# Patient Record
Sex: FEMALE | Race: BLACK OR AFRICAN AMERICAN | Employment: UNEMPLOYED | URBAN - METROPOLITAN AREA
[De-identification: names, ages, dates, MRNs, and addresses within clinical notes are randomized per-mention and may not be internally consistent; named-entity substitution may affect disease eponyms.]

---

## 2017-08-17 PROBLEM — N32.81 OAB (OVERACTIVE BLADDER): Status: ACTIVE | Noted: 2017-08-17

## 2018-09-13 ENCOUNTER — ANESTHESIA EVENT (OUTPATIENT)
Dept: ENDOSCOPY | Age: 58
End: 2018-09-13
Payer: MEDICARE

## 2018-09-14 RX ORDER — ALBUTEROL SULFATE 90 UG/1
2 AEROSOL, METERED RESPIRATORY (INHALATION)
COMMUNITY
Start: 2016-02-18

## 2018-09-14 RX ORDER — HYDROXYZINE 25 MG/1
TABLET, FILM COATED ORAL
Refills: 2 | COMMUNITY
Start: 2018-08-09

## 2018-09-14 RX ORDER — INSULIN GLARGINE 100 [IU]/ML
60 INJECTION, SOLUTION SUBCUTANEOUS
COMMUNITY
Start: 2018-08-22

## 2018-09-14 RX ORDER — SENNOSIDES 8.6 MG/1
8.6 TABLET ORAL AS NEEDED
COMMUNITY

## 2018-09-14 RX ORDER — BUTALBITAL, ACETAMINOPHEN AND CAFFEINE 50; 325; 40 MG/1; MG/1; MG/1
TABLET ORAL
COMMUNITY
Start: 2017-11-27

## 2018-09-14 RX ORDER — AMITRIPTYLINE HYDROCHLORIDE 75 MG/1
75 TABLET, FILM COATED ORAL
COMMUNITY
End: 2018-09-14 | Stop reason: CLARIF

## 2018-09-14 RX ORDER — MAGNESIUM 200 MG
1000 TABLET ORAL DAILY
COMMUNITY
Start: 2013-05-07

## 2018-09-14 RX ORDER — INSULIN ASPART 100 [IU]/ML
25 INJECTION, SOLUTION INTRAVENOUS; SUBCUTANEOUS
COMMUNITY

## 2018-09-14 RX ORDER — PRAVASTATIN SODIUM 40 MG/1
TABLET ORAL
COMMUNITY
Start: 2018-06-25

## 2018-09-14 NOTE — PERIOP NOTES
PAT - SURGICAL PRE-ADMISSION INSTRUCTIONS 
 
NAME:  Gaurav Bates                                                          TODAY'S DATE:  9/14/2018 SURGERY DATE:  9/17/2018                                  SURGERY ARRIVAL TIME:   0800 1. Do NOT eat or drink anything, including candy or gum, after MIDNIGHT on 09/16/2018 , unless you have specific instructions from your Surgeon or Anesthesia Provider to do so. 2. No smoking on the day of surgery. 3. No alcohol 24 hours prior to the day of surgery. 4. No recreational drugs for one week prior to the day of surgery. 5. Leave all valuables, including money/purse, at home. 6. Remove all jewelry, nail polish, makeup (including mascara); no lotions, powders, deodorant, or perfume/cologne/after shave. 7. Glasses/Contact lenses and Dentures may be worn to the hospital.  They will be removed prior to surgery. 8. Call your doctor if symptoms of a cold or illness develop within 24 ours prior to surgery. 9. AN ADULT MUST DRIVE YOU HOME AFTER OUTPATIENT SURGERY. 10. If you are having an OUTPATIENT procedure, please make arrangements for a responsible adult to be with you for 24 hours after your surgery. 11. If you are admitted to the hospital, you will be assigned to a bed after surgery is complete. Normally a family member will not be able to see you until you are in your assigned bed. 15. Family is encouraged to accompany you to the hospital.  We ask visitors in the treatment area to be limited to ONE person at a time to ensure patient privacy. EXCEPTIONS WILL BE MADE AS NEEDED. 15. Children under 12 are discouraged from entering the treatment area and need to be supervised by an adult when in the waiting room. Special Instructions: 
 
Bring list of CURRENT medications . , Bring inhaler. , Take these medications the morning of surgery with a sip of water:  metoprolol, Bring any pertinent legal medical records. , Follow physician instructions about insulin. If NO instructions were given, take your usual dose of BASAL insulin the night BEFORE surgery. , Complete bowel prep per MD instructions. May also take ativan the morning of the procedure These surgical instructions were reviewed with Alpesh Hernandez during the PAT phone call. Directions: On the morning of surgery, please go to the [address] [place] [address]. Enter the building from the [facility] entrance, 1st floor (next to the Emergency Room entrance). Take the elevator to the 2nd floor. Sign in at the Registration Desk. If you have any questions and/or concerns, please do not hesitate to call: 
(Prior to the day of surgery)  [facility] unit:  [phone] (Day of surgery)  [facility] unit:  [phone]

## 2018-09-17 ENCOUNTER — ANESTHESIA (OUTPATIENT)
Dept: ENDOSCOPY | Age: 58
End: 2018-09-17
Payer: MEDICARE

## 2018-09-17 ENCOUNTER — HOSPITAL ENCOUNTER (OUTPATIENT)
Age: 58
Setting detail: OUTPATIENT SURGERY
Discharge: HOME | End: 2018-09-17
Attending: INTERNAL MEDICINE | Admitting: INTERNAL MEDICINE
Payer: MEDICARE

## 2018-09-17 VITALS
SYSTOLIC BLOOD PRESSURE: 115 MMHG | DIASTOLIC BLOOD PRESSURE: 58 MMHG | BODY MASS INDEX: 43.88 KG/M2 | TEMPERATURE: 98 F | OXYGEN SATURATION: 92 % | HEIGHT: 62 IN | RESPIRATION RATE: 14 BRPM | WEIGHT: 238.44 LBS | HEART RATE: 84 BPM

## 2018-09-17 PROBLEM — Z86.010 HISTORY OF COLON POLYPS: Status: ACTIVE | Noted: 2018-09-17

## 2018-09-17 LAB — GLUCOSE BLD STRIP.AUTO-MCNC: 182 MG/DL (ref 70–110)

## 2018-09-17 PROCEDURE — 77030031670 HC DEV INFL ENDOTEK BIG60 MRTM -B: Performed by: INTERNAL MEDICINE

## 2018-09-17 PROCEDURE — 74011250636 HC RX REV CODE- 250/636

## 2018-09-17 PROCEDURE — 74011636637 HC RX REV CODE- 636/637: Performed by: NURSE ANESTHETIST, CERTIFIED REGISTERED

## 2018-09-17 PROCEDURE — 88305 TISSUE EXAM BY PATHOLOGIST: CPT | Performed by: INTERNAL MEDICINE

## 2018-09-17 PROCEDURE — 76040000007: Performed by: INTERNAL MEDICINE

## 2018-09-17 PROCEDURE — 82962 GLUCOSE BLOOD TEST: CPT

## 2018-09-17 PROCEDURE — 74011250636 HC RX REV CODE- 250/636: Performed by: NURSE ANESTHETIST, CERTIFIED REGISTERED

## 2018-09-17 PROCEDURE — 76060000032 HC ANESTHESIA 0.5 TO 1 HR: Performed by: INTERNAL MEDICINE

## 2018-09-17 RX ORDER — LIDOCAINE HYDROCHLORIDE 10 MG/ML
0.1 INJECTION, SOLUTION EPIDURAL; INFILTRATION; INTRACAUDAL; PERINEURAL AS NEEDED
Status: DISCONTINUED | OUTPATIENT
Start: 2018-09-17 | End: 2018-09-17 | Stop reason: HOSPADM

## 2018-09-17 RX ORDER — SODIUM CHLORIDE 0.9 % (FLUSH) 0.9 %
5-10 SYRINGE (ML) INJECTION EVERY 8 HOURS
Status: CANCELLED | OUTPATIENT
Start: 2018-09-17 | End: 2018-09-17

## 2018-09-17 RX ORDER — DEXTROSE MONOHYDRATE 25 G/50ML
25-50 INJECTION, SOLUTION INTRAVENOUS AS NEEDED
Status: DISCONTINUED | OUTPATIENT
Start: 2018-09-17 | End: 2018-09-17 | Stop reason: HOSPADM

## 2018-09-17 RX ORDER — PROPOFOL 10 MG/ML
INJECTION, EMULSION INTRAVENOUS AS NEEDED
Status: DISCONTINUED | OUTPATIENT
Start: 2018-09-17 | End: 2018-09-17 | Stop reason: HOSPADM

## 2018-09-17 RX ORDER — INSULIN LISPRO 100 [IU]/ML
INJECTION, SOLUTION INTRAVENOUS; SUBCUTANEOUS ONCE
Status: COMPLETED | OUTPATIENT
Start: 2018-09-17 | End: 2018-09-17

## 2018-09-17 RX ORDER — SODIUM CHLORIDE 9 MG/ML
25 INJECTION, SOLUTION INTRAVENOUS CONTINUOUS
Status: CANCELLED | OUTPATIENT
Start: 2018-09-17 | End: 2018-09-17

## 2018-09-17 RX ORDER — MAGNESIUM SULFATE 100 %
4 CRYSTALS MISCELLANEOUS AS NEEDED
Status: DISCONTINUED | OUTPATIENT
Start: 2018-09-17 | End: 2018-09-17 | Stop reason: HOSPADM

## 2018-09-17 RX ORDER — FAMOTIDINE 20 MG/1
20 TABLET, FILM COATED ORAL ONCE
Status: DISCONTINUED | OUTPATIENT
Start: 2018-09-17 | End: 2018-09-17 | Stop reason: HOSPADM

## 2018-09-17 RX ORDER — SODIUM CHLORIDE 0.9 % (FLUSH) 0.9 %
5-10 SYRINGE (ML) INJECTION AS NEEDED
Status: CANCELLED | OUTPATIENT
Start: 2018-09-17 | End: 2018-09-17

## 2018-09-17 RX ORDER — SODIUM CHLORIDE, SODIUM LACTATE, POTASSIUM CHLORIDE, CALCIUM CHLORIDE 600; 310; 30; 20 MG/100ML; MG/100ML; MG/100ML; MG/100ML
75 INJECTION, SOLUTION INTRAVENOUS CONTINUOUS
Status: DISCONTINUED | OUTPATIENT
Start: 2018-09-17 | End: 2018-09-17 | Stop reason: HOSPADM

## 2018-09-17 RX ADMIN — PROPOFOL 100 MG: 10 INJECTION, EMULSION INTRAVENOUS at 10:07

## 2018-09-17 RX ADMIN — PROPOFOL 100 MG: 10 INJECTION, EMULSION INTRAVENOUS at 10:16

## 2018-09-17 RX ADMIN — SODIUM CHLORIDE, SODIUM LACTATE, POTASSIUM CHLORIDE, AND CALCIUM CHLORIDE 75 ML/HR: 600; 310; 30; 20 INJECTION, SOLUTION INTRAVENOUS at 09:44

## 2018-09-17 RX ADMIN — INSULIN LISPRO 3 UNITS: 100 INJECTION, SOLUTION INTRAVENOUS; SUBCUTANEOUS at 09:41

## 2018-09-17 RX ADMIN — PROPOFOL 100 MG: 10 INJECTION, EMULSION INTRAVENOUS at 10:03

## 2018-09-17 RX ADMIN — PROPOFOL 100 MG: 10 INJECTION, EMULSION INTRAVENOUS at 10:05

## 2018-09-17 NOTE — IP AVS SNAPSHOT
Cl Doe 
 
 
 [address] 
[phone] Patient: Meredith Woodall MRN: [id number] :1960 About your hospitalization You were admitted on:  2018 You last received care in the:  [facility] PHASE 2 RECOVERY You were discharged on:  2018 Why you were hospitalized Your primary diagnosis was:  History Of Colon Polyps Follow-up Information Follow up With Details Comments Contact Info Izzy Quinonez MD  As needed [place] [address] [facility] [id number] 
[phone] Del Flynn MD   [address] [facility] [address] [id number] 
[phone] Discharge Orders None A check shaheen indicates which time of day the medication should be taken. My Medications CONTINUE taking these medications Instructions Each Dose to Equal  
 Morning Noon Evening Bedtime  
 albuterol 90 mcg/actuation inhaler Commonly known as:  PROVENTIL HFA, VENTOLIN HFA, PROAIR HFA Your last dose was: Your next dose is:    
   
   
 2 Puffs. 2 Puff  
    
   
   
   
  
 amitriptyline 75 mg tablet Commonly known as:  ELAVIL Your last dose was: Your next dose is: Take  by mouth nightly. benztropine 0.5 mg tablet Commonly known as:  COGENTIN Your last dose was: Your next dose is: Take 0.5 mg by mouth two (2) times a day. 0.5 mg  
    
   
   
   
  
 butalbital-acetaminophen-caffeine -40 mg per tablet Commonly known as:  Lois Galvez Your last dose was: Your next dose is: Take 1 Tab by Mouth Every 4 Hours As Needed for Pain (headache). Max 4 tabs per day. cyanocobalamin 1,000 mcg sublingual tablet Commonly known as:  VITAMIN B-12 Your last dose was: Your next dose is:    
   
   
 1,000 mcg. 1000 mcg dicyclomine 20 mg tablet Commonly known as:  BENTYL Your last dose was: Your next dose is: Take 20 mg by mouth every six (6) hours. 20 mg  
    
   
   
   
  
 donepezil 10 mg tablet Commonly known as:  ARICEPT Your last dose was: Your next dose is: Take 10 mg by mouth nightly. 10 mg  
    
   
   
   
  
 EPIPEN 0.3 mg/0.3 mL injection Generic drug:  EPINEPHrine Your last dose was: Your next dose is: 0.3 mg by IntraMUSCular route once as needed. 0.3 mg  
    
   
   
   
  
 fexofenadine 180 mg tablet Commonly known as:  Rosado Pali Your last dose was: Your next dose is: Take  by mouth. fluticasone 50 mcg/actuation nasal spray Commonly known as:  Jeison Esposito Your last dose was: Your next dose is: 2 Sprays by Both Nostrils route daily. 2 Spray  
    
   
   
   
  
 furosemide 40 mg tablet Commonly known as:  LASIX Your last dose was: Your next dose is: Take  by mouth daily. hydrOXYzine HCl 25 mg tablet Commonly known as:  ATARAX Your last dose was: Your next dose is: TAKE 1 TO 2 TABLETS BY MOUTH  EVERY EVENING  
     
   
   
   
  
 insulin aspart U-100 100 unit/mL Inpn Commonly known as:  Toro Dia Your last dose was: Your next dose is:    
   
   
 25 Units. 25 Units  
    
   
   
   
  
 insulin glargine 100 unit/mL (3 mL) Inpn Commonly known as:  Sandra Brower Your last dose was: Your next dose is:    
   
   
 60 Units. 60 Units  
    
   
   
   
  
 levocetirizine 5 mg tablet Commonly known as:  Sadiq Rodriguez Your last dose was: Your next dose is: Take  by mouth. LORazepam 0.5 mg tablet Commonly known as:  ATIVAN Your last dose was: Your next dose is: Take  by mouth. methocarbamol 750 mg tablet Commonly known as:  ROBAXIN Your last dose was: Your next dose is: Take  by mouth four (4) times daily. montelukast 10 mg tablet Commonly known as:  SINGULAIR Your last dose was: Your next dose is: Take 10 mg by mouth daily. 10 mg NITROSTAT 0.4 mg SL tablet Generic drug:  nitroglycerin Your last dose was: Your next dose is:    
   
   
 by SubLINGual route every five (5) minutes as needed for Chest Pain. OMALIZUMAB SC Your last dose was: Your next dose is:    
   
   
 Inject  beneath the skin Every 30 Days. Omeprazole delayed release 20 mg tablet Commonly known as:  PRILOSEC D/R Your last dose was: Your next dose is: Take 20 mg by mouth daily. 20 mg  
    
   
   
   
  
 perphenazine 2 mg tablet Commonly known as:  TRILAFON Your last dose was: Your next dose is: Take  by mouth.  
     
   
   
   
  
 pindolol 10 mg tablet Commonly known as:  VISKEN Your last dose was: Your next dose is: Take  by mouth daily. potassium chloride 20 mEq tablet Commonly known as:  K-DUR, KLOR-CON Your last dose was: Your next dose is: Take  by mouth two (2) times a day. pravastatin 40 mg tablet Commonly known as:  PRAVACHOL Your last dose was: Your next dose is: TAKE 1 TABLET BY MOUTH ONCE DAILY  
     
   
   
   
  
 senna 8.6 mg tablet Commonly known as:  Ed Hoffman Your last dose was: Your next dose is:    
   
   
 8.6 mg.  
 8.6 mg  
    
   
   
   
  
 spironolactone 50 mg tablet Commonly known as:  ALDACTONE Your last dose was: Your next dose is: Take  by mouth daily. topiramate 50 mg tablet Commonly known as:  TOPAMAX Your last dose was: Your next dose is: Take  by mouth two (2) times a day. VITAMIN D2 50,000 unit capsule Generic drug:  ergocalciferol Your last dose was: Your next dose is: Take 50,000 Units by mouth every seven (7) days. [address] Units Discharge Instructions COLONOSCOPY DISCHARGE INSTRUCTIONS You have just had an examination of your colon (large intestine). The medication given to you during your procedure will be acting in your body for the next 12 hours, gradually wearing off. Your face may be flushed, skin may be warm and sweaty, you might feel a little bloated or nauseated and sleepy. Please be aware of the followin. For the next 12 hours you SHOULD NOT: 
 
a. Drive, Operate any machinery. b. Drink alcohol. 
c. Engage in activities that require mental sharpness or manual dexterity such as cooking. d. Take any medications other than prescribed by a physician. 
e. Make any legal or financial decisions. 2. Call this office immediately, if: 
 
a. Onset of new or increase of tanisha rectal bleeding. 
b. Fever > 101 
c. Increased abdominal pain Additional instructions: 
 
a. Diet as recommended 
b. Due to risk of dehydration after colon prep and sedation, avoid extended periods of time outdoors if the day is hot and humid. c. If biopsies were taken, you will receive a post card or telephone call with results of your biopsies and suggestion for your next colonoscopy. Please allow us at least 3 weeks to get back with you about your results. If we have not contacted you by the, please call us for results. # [phone] 
d.  If you had polyp(s) removed DO NOT TAKE NSAIDS (Aspirin, Advil, Aleve, Naproxyn, Ibuprofen, etc) for 2 weeks. Tylenol is OK to use. DISCHARGE SUMMARY from Nurse PATIENT INSTRUCTIONS: 
 
After general anesthesia or intravenous sedation, for 24 hours or while taking prescription Narcotics: · Limit your activities · Do not drive and operate hazardous machinery · Do not make important personal or business decisions · Do  not drink alcoholic beverages · If you have not urinated within 8 hours after discharge, please contact your surgeon on call. Report the following to your surgeon: 
· Excessive pain, swelling, redness or odor of or around the surgical area · Temperature over 100.5 · Nausea and vomiting lasting longer than 4 hours or if unable to take medications · Any signs of decreased circulation or nerve impairment to extremity: change in color, persistent  numbness, tingling, coldness or increase pain · Any questions What to do at Home: 
Recommended activity: Activity as tolerated and no driving for today, These are general instructions for a healthy lifestyle: No smoking/ No tobacco products/ Avoid exposure to second hand smoke Surgeon General's Warning:  Quitting smoking now greatly reduces serious risk to your health. Obesity, smoking, and sedentary lifestyle greatly increases your risk for illness A healthy diet, regular physical exercise & weight monitoring are important for maintaining a healthy lifestyle You may be retaining fluid if you have a history of heart failure or if you experience any of the following symptoms:  Weight gain of 3 pounds or more overnight or 5 pounds in a week, increased swelling in our hands or feet or shortness of breath while lying flat in bed. Please call your doctor as soon as you notice any of these symptoms; do not wait until your next office visit. Recognize signs and symptoms of STROKE: 
 
F-face looks uneven A-arms unable to move or move unevenly S-speech slurred or non-existent T-time-call 911 as soon as signs and symptoms begin-DO NOT go Back to bed or wait to see if you get better-TIME IS BRAIN. Warning Signs of HEART ATTACK Call 911 if you have these symptoms: 
? Chest discomfort. Most heart attacks involve discomfort in the center of the chest that lasts more than a few minutes, or that goes away and comes back. It can feel like uncomfortable pressure, squeezing, fullness, or pain. ? Discomfort in other areas of the upper body. Symptoms can include pain or discomfort in one or both arms, the back, neck, jaw, or stomach. ? Shortness of breath with or without chest discomfort. ? Other signs may include breaking out in a cold sweat, nausea, or lightheadedness. Don't wait more than five minutes to call [address]! Fast action can save your life. Calling 911 is almost always the fastest way to get lifesaving treatment. Emergency Medical Services staff can begin treatment when they arrive  up to an hour sooner than if someone gets to the hospital by car. The discharge information has been reviewed with the patient. The patient verbalized understanding. Discharge medications reviewed with the patient and appropriate educational materials and side effects teaching were provided. Patient armband removed and given to patient to take home. Patient was informed of the privacy risks if armband lost or stolen 
 
___________________________________________________________________________________________________________________________________ Introducing [facility]! Dear Giovani Starr: Thank you for requesting a [employer] account. Our records indicate that you already have an active [employer] D[employer] account. You can access your account anytime at [email or web address]. [employer] Did you know that you can access your hospital and ER discharge instructions at any time in [employer] D[employer]?   You can also review all of your test results from your hospital stay or ER visit. Additional Information If you have questions, please visit the Frequently Asked Questions section of the [employer] website at [email or web address] Remember, [employer] is NOT to be used for urgent needs. For medical emergencies, dial 911. Now available from your iPhone and Android! Introducing Hao Escobedo As a [facility] patient, I wanted to make you aware of our electronic visit tool called Hao Escobedo. [employer] 24/7 allows you to connect within minutes with a medical provider 24 hours a day, seven days a week via a mobile device or tablet or logging into a secure website from your computer. You can access Hao Escobedo from anywhere in the [place]. A virtual visit might be right for you when you have a simple condition and feel like you just dont want to get out of bed, or cant get away from work for an appointment, when your regular [facility] provider is not available (evenings, weekends or holidays), or when youre out of town and need minor care. Electronic visits cost only $49 and if the [employer] 24/7 provider determines a prescription is needed to treat your condition, one can be electronically transmitted to a nearby pharmacy*. Please take a moment to enroll today if you have not already done so. The enrollment process is free and takes just a few minutes. To enroll, please download the [employer] 24/7 laurie to your tablet or phone, or visit [email or web address] to enroll on your computer. And, as an [address] [place] [address] patient with a [employer] account, the results of your visits will be scanned into your electronic medical record and your primary care provider will be able to view the scanned results. We urge you to continue to see your regular [facility] provider for your ongoing medical care.   And while your primary care provider may not be the one available when you seek a Hao Escobedo virtual visit, the peace of mind you get from getting a real diagnosis real time can be priceless. For more information on Hao Escobedo, view our Frequently Asked Questions (FAQs) at [email or web address]. Sincerely, 
 
Ramirez Julian MD 
Chief Medical Officer Jd Echols *:  certain medications cannot be prescribed via Hao Escobedo Providers Seen During Your Hospitalization Provider Specialty Primary office phone Bibi Jon MD Gastroenterology [phone] Your Primary Care Physician (PCP) Primary Care Physician Office Phone Office Fax 11 [address] [phone] You are allergic to the following Allergen Reactions Seafood Hives Iodinated Contrast- Oral And Iv Dye Other (comments) Prednisone Other (comments) Elevates her BS. Recent Documentation Height Weight Breastfeeding? BMI OB Status Smoking Status 1.575 m 108.2 kg No 43.61 kg/m2 Perimenopausal Never Smoker Emergency Contacts Name Discharge Info Relation Home Work Mobile McNair Sr.,Duane DISCHARGE CAREGIVER [3] Spouse [3] [phone] Patient Belongings The following personal items are in your possession at time of discharge: 
  Dental Appliances: None  Visual Aid: Glasses Please provide this summary of care documentation to your next provider. Signatures-by signing, you are acknowledging that this After Visit Summary has been reviewed with you and you have received a copy. Patient Signature:  ____________________________________________________________ Date:  ____________________________________________________________  
  
Mathew Sport Provider Signature:  ____________________________________________________________ Date:  ____________________________________________________________

## 2018-09-17 NOTE — PERIOP NOTES
Phase 2 Recovery Summary Patient arrived to Phase 2 at 1256 Report received from Chantell Mohr, [address] [facility] Vitals:  
 09/14/18 1211 09/17/18 0905 09/17/18 1034 09/17/18 1035 BP:  119/80 115/58 115/58 Pulse:  70 84 84 Resp:  20 14 Temp:  98 °F (36.7 °C) SpO2:  96% 92% 92% Weight: 99.8 kg (220 lb) 108.2 kg (238 lb 7 oz) Height: 5' 2\" (1.575 m) 5' 2\" (1.575 m) disoriented Lines and Drains Peripheral Intravenous Line:  
Peripheral IV 09/17/18 Right Hand (Active) Site Assessment Clean, dry, & intact 9/17/2018  9:08 AM  
Phlebitis Assessment 0 9/17/2018  9:08 AM  
Infiltration Assessment 0 9/17/2018  9:08 AM  
Dressing Status Clean, dry, & intact 9/17/2018  9:08 AM  
Dressing Type Tape;Transparent 9/17/2018  9:08 AM  
Hub Color/Line Status Pink; Infusing 9/17/2018  9:08 AM  
 
 
Wound 46- Dr. Juwan Gutierrez at bedside discussing results with family. Patient difficult to arouse in phase 2. IV fluids continue to infuse and after approx 15 minutes, patient was able to become alert enough to tolerate liquids and sit patient up in chair. No complaints of pain or discomfort. Questions and concerns addressed. Patient taken to private vehicle via wheelchair. Patient discharged to home with   at  [facility]

## 2018-09-17 NOTE — ANESTHESIA POSTPROCEDURE EVALUATION
Post-Anesthesia Evaluation and Assessment Patient: Alexus Rey MRN: [id number]  SSN: [id number] YOB: 1960  Age: 63 y.o. Sex: female Cardiovascular Function/Vital Signs Visit Vitals  /58  Pulse 84  Temp 36.7 °C (98 °F)  Resp 14  
 Ht 5' 2\" (1.575 m)  Wt 108.2 kg (238 lb 7 oz)  SpO2 92%  Breastfeeding No  
 BMI 43.61 kg/m2 Patient is status post MAC anesthesia for Procedure(s): 
COLONOSCOPY. Nausea/Vomiting: None Postoperative hydration reviewed and adequate. Pain: 
Pain Scale 1: Numeric (0 - 10) (09/17/18 1055) Pain Intensity 1: 0 (09/17/18 1055) Managed Neurological Status: At baseline Mental Status and Level of Consciousness: Alert and oriented Pulmonary Status:  
O2 Device: Room air (09/17/18 1034) Adequate oxygenation and airway patent Complications related to anesthesia: None Post-anesthesia assessment completed. No concerns Signed By: Ancel Ganser, MD   
 September 17, 2018

## 2018-09-17 NOTE — DISCHARGE INSTRUCTIONS
COLONOSCOPY DISCHARGE INSTRUCTIONS    You have just had an examination of your colon (large intestine). The medication given to you during your procedure will be acting in your body for the next 12 hours, gradually wearing off. Your face may be flushed, skin may be warm and sweaty, you might feel a little bloated or nauseated and sleepy. Please be aware of the followin. For the next 12 hours you SHOULD NOT:    a. Drive, Operate any machinery. b. Drink alcohol.  c. Engage in activities that require mental sharpness or manual dexterity such as cooking. d. Take any medications other than prescribed by a physician.  e. Make any legal or financial decisions. 2. Call this office immediately, if:    a. Onset of new or increase of tanisha rectal bleeding.  b. Fever > 101  c. Increased abdominal pain    Additional instructions:    a. Diet as recommended  b. Due to risk of dehydration after colon prep and sedation, avoid extended periods of time outdoors if the day is hot and humid. c. If biopsies were taken, you will receive a post card or telephone call with results of your biopsies and suggestion for your next colonoscopy. Please allow us at least 3 weeks to get back with you about your results. If we have not contacted you by the, please call us for results. Ph# [phone]  d. If you had polyp(s) removed DO NOT TAKE NSAIDS (Aspirin, Advil, Aleve, Naproxyn, Ibuprofen, etc) for 2 weeks. Tylenol is OK to use. DISCHARGE SUMMARY from Nurse    PATIENT INSTRUCTIONS:    After general anesthesia or intravenous sedation, for 24 hours or while taking prescription Narcotics:  · Limit your activities  · Do not drive and operate hazardous machinery  · Do not make important personal or business decisions  · Do  not drink alcoholic beverages  · If you have not urinated within 8 hours after discharge, please contact your surgeon on call.     Report the following to your surgeon:  · Excessive pain, swelling, redness or odor of or around the surgical area  · Temperature over 100.5  · Nausea and vomiting lasting longer than 4 hours or if unable to take medications  · Any signs of decreased circulation or nerve impairment to extremity: change in color, persistent  numbness, tingling, coldness or increase pain  · Any questions    What to do at Home:  Recommended activity: Activity as tolerated and no driving for today,       These are general instructions for a healthy lifestyle:    No smoking/ No tobacco products/ Avoid exposure to second hand smoke  Surgeon General's Warning:  Quitting smoking now greatly reduces serious risk to your health. Obesity, smoking, and sedentary lifestyle greatly increases your risk for illness    A healthy diet, regular physical exercise & weight monitoring are important for maintaining a healthy lifestyle    You may be retaining fluid if you have a history of heart failure or if you experience any of the following symptoms:  Weight gain of 3 pounds or more overnight or 5 pounds in a week, increased swelling in our hands or feet or shortness of breath while lying flat in bed. Please call your doctor as soon as you notice any of these symptoms; do not wait until your next office visit. Recognize signs and symptoms of STROKE:    F-face looks uneven    A-arms unable to move or move unevenly    S-speech slurred or non-existent    T-time-call 911 as soon as signs and symptoms begin-DO NOT go       Back to bed or wait to see if you get better-TIME IS BRAIN. Warning Signs of HEART ATTACK     Call 911 if you have these symptoms:   Chest discomfort. Most heart attacks involve discomfort in the center of the chest that lasts more than a few minutes, or that goes away and comes back. It can feel like uncomfortable pressure, squeezing, fullness, or pain.  Discomfort in other areas of the upper body.  Symptoms can include pain or discomfort in one or both arms, the back, neck, jaw, or stomach.  Shortness of breath with or without chest discomfort.  Other signs may include breaking out in a cold sweat, nausea, or lightheadedness. Don't wait more than five minutes to call 911 - MINUTES MATTER! Fast action can save your life. Calling 911 is almost always the fastest way to get lifesaving treatment. Emergency Medical Services staff can begin treatment when they arrive -- up to an hour sooner than if someone gets to the hospital by car. The discharge information has been reviewed with the patient. The patient verbalized understanding. Discharge medications reviewed with the patient and appropriate educational materials and side effects teaching were provided. Patient armband removed and given to patient to take home.   Patient was informed of the privacy risks if armband lost or stolen    ___________________________________________________________________________________________________________________________________

## 2018-09-17 NOTE — ANESTHESIA PREPROCEDURE EVALUATION
Anesthetic History No history of anesthetic complications Review of Systems / Medical History Patient summary reviewed and pertinent labs reviewed Pulmonary Sleep apnea: CPAP Asthma Neuro/Psych Psychiatric history Cardiovascular Hypertension Exercise tolerance: >4 METS 
  
GI/Hepatic/Renal 
Within defined limits Endo/Other Morbid obesity Other Findings Comments: Documentation of current medication Current medications obtained, documented and obtained? YES Risk Factors for Postoperative nausea/vomiting: 
     History of postoperative nausea/vomiting? NO Female? YES Motion sickness? NO Intended opioid administration for postoperative analgesia? NO Smoking Abstinence: 
Current Smoker? NO Elective Surgery? YES Seen preoperatively by anesthesiologist or proxy prior to day of surgery? YES Pt abstained from smoking 24 hours prior to anesthesia? N/A Preventive care/screening for High Blood Pressure: 
Aged 18 years and older: YES Screened for high blood pressure: YES Patients with high blood pressure referred to primary care provider 
 for BP management: YES Physical Exam 
 
Airway Mallampati: III 
TM Distance: 4 - 6 cm Neck ROM: normal range of motion Mouth opening: Normal 
 
 Cardiovascular Regular rate and rhythm,  S1 and S2 normal,  no murmur, click, rub, or gallop Rhythm: regular Rate: normal 
 
 
 
 Dental 
 
Dentition: Lower dentition intact and Upper dentition intact Pulmonary Breath sounds clear to auscultation Abdominal 
GI exam deferred Other Findings Anesthetic Plan ASA: 3 Anesthesia type: MAC Induction: Intravenous Anesthetic plan and risks discussed with: Patient

## 2018-09-17 NOTE — H&P
Date of Surgery Update: 
Augusta Issa was seen and examined. History and physical has been reviewed. The patient has been examined.  There have been no significant clinical changes since the completion of the originally dated History and Physical. 
 
Signed By: Sita Morales MD   
 September 17, 2018 9:13 AM

## 2018-09-17 NOTE — PROCEDURES
Colonoscopy Procedure Note    Patient: Camille Mojica MRN: [id number]  SSN: [id number]    YOB: 1960  Age: 63 y.o. Sex: female      Date of Procedure: 9/17/2018      Procedures:  COLONOSCOPY:   HISTORY UPDATE: History and physical has been reviewed. The patient has been examined. There have been no significant clinical changes since the completion of the originally dated History and Physical.   INDICATION:    Family history of coloretal cancer (screening only), Screening colonoscopy   PROCEDURE PERFORMED:Colonoscopy was complete to cecum. ENDOSCOPIST: Maria Esther Tavarez MD   ASSISTANT:  Endoscopy Technician-1: Faith Mujica  Endoscopy RN-1: Alexandra Akhtar RN   CLASSIFICATION OF PREOPERATIVE RISK: ASA 2 - Patient with mild systemic disease with no functional limitations   ANESTHESIA:  MAC anesthesia   ENDOSCOPE: [id number]                                                                                                        EXTENT OF EXAM: Cecum    QUALITY OF COLON PREPARATION:  good     DESCRIPTION OF PROCEDURE:  The procedure was discussed with the     patient including but not limited to IV conscious sedation, bleeding, perforation and missed polyps and the consent form was signed and witnessed. A safety timeout was performed. Procedure done with MAC. The patient's vitals were monitored at all times, including heart rate and rhythm, oxygen saturation, and blood pressure. The patient was then placed into the left lateral decubitus position. A rectal exam was performed. The Olympus Adult diagnostic colonscope was then passed under direct visualization with ease to the cecum. The cecum was identified by landmarks including Ileocecal valve, appendiceal orifice and crow's foot. The scope was then slowly withdrawn while closely visualizing the mucosa in the rectum a retroflection was performed and distal rectum visualized. The scope was then removed.   The patient was transferred to the recovery room in stable condition. FINDINGS:1. A few diminutive polyps in the rectum removed with cold biopsy forceps 2. Mild diverticulosis seen on the left side of the colon 3. Mild melanosis coli seen throughout the colon 4. No hemorrhoids seen on retroflexion. EBL: None   SPECIMENS:   ID Type Source Tests Collected by Time Destination   1 : bx polyp Preservative Rectum  Erica Duque MD 9/17/2018 1021 Pathology      IMPRESSION: 1. A few diminutive polyps in the rectum removed with cold biopsy forceps 2. Mild diverticulosis seen on the left side of the colon 3. Mild melanosis coli seen throughout the colon 4. No hemorrhoids seen on retroflexion. RECOMMENDATIONS:               1.   Follow up on the biopsy results                  2.  Repeat colonoscopy in 5 yrs.                    Follow Up:   As needed       Leane Olszewski, MD   9/17/2018

## 2019-05-16 RX ORDER — ASPIRIN 81 MG/1
81 TABLET ORAL DAILY
COMMUNITY

## 2019-05-16 NOTE — PERIOP NOTES
PAT - SURGICAL PRE-ADMISSION INSTRUCTIONS 
 
NAME:  Andres Smith                                                          TODAY'S DATE:  5/16/2019 SURGERY DATE:  5/20/2019                                  SURGERY ARRIVAL TIME:   7937 1. Do NOT eat or drink anything, including candy or gum, after MIDNIGHT on 05/19 , unless you have specific instructions from your Surgeon or Anesthesia Provider to do so. 2. No smoking on the day of surgery. 3. No alcohol 24 hours prior to the day of surgery. 4. No recreational drugs for one week prior to the day of surgery. 5. Leave all valuables, including money/purse, at home. 6. Remove all jewelry, nail polish, makeup (including mascara); no lotions, powders, deodorant, or perfume/cologne/after shave. 7. Glasses/Contact lenses and Dentures may be worn to the hospital.  They will be removed prior to surgery. 8. Call your doctor if symptoms of a cold or illness develop within 24 ours prior to surgery. 9. AN ADULT MUST DRIVE YOU HOME AFTER OUTPATIENT SURGERY. 10. If you are having an OUTPATIENT procedure, please make arrangements for a responsible adult to be with you for 24 hours after your surgery. 11. If you are admitted to the hospital, you will be assigned to a bed after surgery is complete. Normally a family member will not be able to see you until you are in your assigned bed. 15. Family is encouraged to accompany you to the hospital.  We ask visitors in the treatment area to be limited to ONE person at a time to ensure patient privacy. EXCEPTIONS WILL BE MADE AS NEEDED. 15. Children under 12 are discouraged from entering the treatment area and need to be supervised by an adult when in the waiting room. Special Instructions: Take these medications the morning of surgery with a sip of water:  NONE, Follow physician instructions about insulin. If NO instructions were given, take your usual dose of BASAL insulin the night BEFORE surgery. Patient Prep: 
 
shower with anti-bacterial soap These surgical instructions were reviewed with Rupert De La Torre during the PAT phone call. Directions: On the morning of surgery, please go to the [address] [facility]. Enter the building from the [facility] entrance, 1st floor (next to the Emergency Room entrance). Take the elevator to the 2nd floor. Sign in at the Registration Desk. If you have any questions and/or concerns, please do not hesitate to call: 
(Prior to the day of surgery)  [facility] unit:  [phone] (Day of surgery)  [facility] unit:  [phone]

## 2019-05-17 ENCOUNTER — ANESTHESIA EVENT (OUTPATIENT)
Dept: SURGERY | Age: 59
End: 2019-05-17
Payer: MEDICARE

## 2019-05-20 ENCOUNTER — ANESTHESIA (OUTPATIENT)
Dept: SURGERY | Age: 59
End: 2019-05-20
Payer: MEDICARE

## 2019-05-20 ENCOUNTER — HOSPITAL ENCOUNTER (OUTPATIENT)
Age: 59
Setting detail: OUTPATIENT SURGERY
Discharge: HOME | End: 2019-05-20
Attending: DENTIST | Admitting: DENTIST
Payer: MEDICARE

## 2019-05-20 VITALS
TEMPERATURE: 97.6 F | DIASTOLIC BLOOD PRESSURE: 63 MMHG | SYSTOLIC BLOOD PRESSURE: 107 MMHG | OXYGEN SATURATION: 100 % | RESPIRATION RATE: 16 BRPM | BODY MASS INDEX: 42.91 KG/M2 | HEIGHT: 62 IN | WEIGHT: 233.2 LBS | HEART RATE: 70 BPM

## 2019-05-20 DIAGNOSIS — K02.9 CARIES: Primary | ICD-10-CM

## 2019-05-20 LAB
GLUCOSE BLD STRIP.AUTO-MCNC: 202 MG/DL (ref 70–110)
GLUCOSE BLD STRIP.AUTO-MCNC: 228 MG/DL (ref 70–110)

## 2019-05-20 PROCEDURE — 77030032490 HC SLV COMPR SCD KNE COVD -B: Performed by: DENTIST

## 2019-05-20 PROCEDURE — 74011000250 HC RX REV CODE- 250: Performed by: DENTIST

## 2019-05-20 PROCEDURE — 76210000016 HC OR PH I REC 1 TO 1.5 HR: Performed by: DENTIST

## 2019-05-20 PROCEDURE — 74011250636 HC RX REV CODE- 250/636

## 2019-05-20 PROCEDURE — 74011636637 HC RX REV CODE- 636/637: Performed by: NURSE ANESTHETIST, CERTIFIED REGISTERED

## 2019-05-20 PROCEDURE — 76210000021 HC REC RM PH II 0.5 TO 1 HR: Performed by: DENTIST

## 2019-05-20 PROCEDURE — 77030014006 HC SPNG HEMSTAT J&J -A: Performed by: DENTIST

## 2019-05-20 PROCEDURE — 74011250637 HC RX REV CODE- 250/637: Performed by: NURSE ANESTHETIST, CERTIFIED REGISTERED

## 2019-05-20 PROCEDURE — 77030008683 HC TU ET CUF COVD -A: Performed by: ANESTHESIOLOGY

## 2019-05-20 PROCEDURE — 77030020268 HC MISC GENERAL SUPPLY: Performed by: DENTIST

## 2019-05-20 PROCEDURE — 77030031139 HC SUT VCRL2 J&J -A: Performed by: DENTIST

## 2019-05-20 PROCEDURE — 82962 GLUCOSE BLOOD TEST: CPT

## 2019-05-20 PROCEDURE — 74011250636 HC RX REV CODE- 250/636: Performed by: NURSE ANESTHETIST, CERTIFIED REGISTERED

## 2019-05-20 PROCEDURE — 76060000032 HC ANESTHESIA 0.5 TO 1 HR: Performed by: DENTIST

## 2019-05-20 PROCEDURE — 77030018836 HC SOL IRR NACL ICUM -A: Performed by: DENTIST

## 2019-05-20 PROCEDURE — 77030021678 HC GLIDESCP STAT DISP VERT -B: Performed by: ANESTHESIOLOGY

## 2019-05-20 PROCEDURE — 76010000138 HC OR TIME 0.5 TO 1 HR: Performed by: DENTIST

## 2019-05-20 RX ORDER — PROPOFOL 10 MG/ML
INJECTION, EMULSION INTRAVENOUS AS NEEDED
Status: DISCONTINUED | OUTPATIENT
Start: 2019-05-20 | End: 2019-05-20 | Stop reason: HOSPADM

## 2019-05-20 RX ORDER — SODIUM CHLORIDE, SODIUM LACTATE, POTASSIUM CHLORIDE, CALCIUM CHLORIDE 600; 310; 30; 20 MG/100ML; MG/100ML; MG/100ML; MG/100ML
50 INJECTION, SOLUTION INTRAVENOUS CONTINUOUS
Status: DISCONTINUED | OUTPATIENT
Start: 2019-05-20 | End: 2019-05-20 | Stop reason: HOSPADM

## 2019-05-20 RX ORDER — LIDOCAINE HYDROCHLORIDE AND EPINEPHRINE 20; 5 MG/ML; UG/ML
INJECTION, SOLUTION EPIDURAL; INFILTRATION; INTRACAUDAL; PERINEURAL AS NEEDED
Status: DISCONTINUED | OUTPATIENT
Start: 2019-05-20 | End: 2019-05-20 | Stop reason: HOSPADM

## 2019-05-20 RX ORDER — FENTANYL CITRATE 50 UG/ML
INJECTION, SOLUTION INTRAMUSCULAR; INTRAVENOUS AS NEEDED
Status: DISCONTINUED | OUTPATIENT
Start: 2019-05-20 | End: 2019-05-20 | Stop reason: HOSPADM

## 2019-05-20 RX ORDER — SODIUM CHLORIDE, SODIUM LACTATE, POTASSIUM CHLORIDE, CALCIUM CHLORIDE 600; 310; 30; 20 MG/100ML; MG/100ML; MG/100ML; MG/100ML
75 INJECTION, SOLUTION INTRAVENOUS CONTINUOUS
Status: DISCONTINUED | OUTPATIENT
Start: 2019-05-20 | End: 2019-05-20 | Stop reason: HOSPADM

## 2019-05-20 RX ORDER — INSULIN LISPRO 100 [IU]/ML
INJECTION, SOLUTION INTRAVENOUS; SUBCUTANEOUS ONCE
Status: COMPLETED | OUTPATIENT
Start: 2019-05-20 | End: 2019-05-20

## 2019-05-20 RX ORDER — DEXTROSE MONOHYDRATE 25 G/50ML
25-50 INJECTION, SOLUTION INTRAVENOUS AS NEEDED
Status: DISCONTINUED | OUTPATIENT
Start: 2019-05-20 | End: 2019-05-20 | Stop reason: HOSPADM

## 2019-05-20 RX ORDER — DIPHENHYDRAMINE HYDROCHLORIDE 50 MG/ML
25 INJECTION, SOLUTION INTRAMUSCULAR; INTRAVENOUS
Status: DISCONTINUED | OUTPATIENT
Start: 2019-05-20 | End: 2019-05-20 | Stop reason: HOSPADM

## 2019-05-20 RX ORDER — LIDOCAINE HYDROCHLORIDE 20 MG/ML
INJECTION, SOLUTION EPIDURAL; INFILTRATION; INTRACAUDAL; PERINEURAL AS NEEDED
Status: DISCONTINUED | OUTPATIENT
Start: 2019-05-20 | End: 2019-05-20 | Stop reason: HOSPADM

## 2019-05-20 RX ORDER — CEFAZOLIN SODIUM 2 G/50ML
2 SOLUTION INTRAVENOUS
Status: DISCONTINUED | OUTPATIENT
Start: 2019-05-20 | End: 2019-05-20 | Stop reason: HOSPADM

## 2019-05-20 RX ORDER — AMOXICILLIN 500 MG/1
500 CAPSULE ORAL 3 TIMES DAILY
Qty: 21 CAP | Refills: 0 | Status: SHIPPED | OUTPATIENT
Start: 2019-05-20 | End: 2019-05-27

## 2019-05-20 RX ORDER — FAMOTIDINE 20 MG/1
20 TABLET, FILM COATED ORAL ONCE
Status: COMPLETED | OUTPATIENT
Start: 2019-05-20 | End: 2019-05-20

## 2019-05-20 RX ORDER — HYDROCODONE BITARTRATE AND ACETAMINOPHEN 5; 325 MG/1; MG/1
1 TABLET ORAL
Qty: 15 TAB | Refills: 0 | Status: SHIPPED | OUTPATIENT
Start: 2019-05-20 | End: 2019-05-25

## 2019-05-20 RX ORDER — NALOXONE HYDROCHLORIDE 1 MG/ML
INJECTION INTRAMUSCULAR; INTRAVENOUS; SUBCUTANEOUS AS NEEDED
Status: DISCONTINUED | OUTPATIENT
Start: 2019-05-20 | End: 2019-05-20 | Stop reason: HOSPADM

## 2019-05-20 RX ORDER — HYDROCODONE BITARTRATE AND ACETAMINOPHEN 5; 325 MG/1; MG/1
1 TABLET ORAL ONCE
Status: DISCONTINUED | OUTPATIENT
Start: 2019-05-20 | End: 2019-05-20 | Stop reason: HOSPADM

## 2019-05-20 RX ORDER — INSULIN LISPRO 100 [IU]/ML
INJECTION, SOLUTION INTRAVENOUS; SUBCUTANEOUS ONCE
Status: DISCONTINUED | OUTPATIENT
Start: 2019-05-20 | End: 2019-05-20 | Stop reason: HOSPADM

## 2019-05-20 RX ORDER — MIDAZOLAM HYDROCHLORIDE 1 MG/ML
INJECTION, SOLUTION INTRAMUSCULAR; INTRAVENOUS AS NEEDED
Status: DISCONTINUED | OUTPATIENT
Start: 2019-05-20 | End: 2019-05-20 | Stop reason: HOSPADM

## 2019-05-20 RX ORDER — MAGNESIUM SULFATE 100 %
4 CRYSTALS MISCELLANEOUS AS NEEDED
Status: DISCONTINUED | OUTPATIENT
Start: 2019-05-20 | End: 2019-05-20 | Stop reason: HOSPADM

## 2019-05-20 RX ORDER — FENTANYL CITRATE 50 UG/ML
50 INJECTION, SOLUTION INTRAMUSCULAR; INTRAVENOUS AS NEEDED
Status: DISCONTINUED | OUTPATIENT
Start: 2019-05-20 | End: 2019-05-20 | Stop reason: HOSPADM

## 2019-05-20 RX ORDER — SUCCINYLCHOLINE CHLORIDE 20 MG/ML
INJECTION INTRAMUSCULAR; INTRAVENOUS AS NEEDED
Status: DISCONTINUED | OUTPATIENT
Start: 2019-05-20 | End: 2019-05-20 | Stop reason: HOSPADM

## 2019-05-20 RX ADMIN — SUCCINYLCHOLINE CHLORIDE 100 MG: 20 INJECTION INTRAMUSCULAR; INTRAVENOUS at 11:14

## 2019-05-20 RX ADMIN — INSULIN LISPRO 6 UNITS: 100 INJECTION, SOLUTION INTRAVENOUS; SUBCUTANEOUS at 13:07

## 2019-05-20 RX ADMIN — NALOXONE HYDROCHLORIDE 0.1 MG: 1 INJECTION INTRAMUSCULAR; INTRAVENOUS; SUBCUTANEOUS at 11:51

## 2019-05-20 RX ADMIN — PROPOFOL 150 MG: 10 INJECTION, EMULSION INTRAVENOUS at 11:14

## 2019-05-20 RX ADMIN — MIDAZOLAM HYDROCHLORIDE 2 MG: 1 INJECTION, SOLUTION INTRAMUSCULAR; INTRAVENOUS at 11:09

## 2019-05-20 RX ADMIN — FENTANYL CITRATE 100 MCG: 50 INJECTION, SOLUTION INTRAMUSCULAR; INTRAVENOUS at 11:14

## 2019-05-20 RX ADMIN — LIDOCAINE HYDROCHLORIDE 50 MG: 20 INJECTION, SOLUTION EPIDURAL; INFILTRATION; INTRACAUDAL; PERINEURAL at 11:14

## 2019-05-20 RX ADMIN — SODIUM CHLORIDE, SODIUM LACTATE, POTASSIUM CHLORIDE, AND CALCIUM CHLORIDE: 600; 310; 30; 20 INJECTION, SOLUTION INTRAVENOUS at 11:00

## 2019-05-20 RX ADMIN — NALOXONE HYDROCHLORIDE 0.1 MG: 1 INJECTION INTRAMUSCULAR; INTRAVENOUS; SUBCUTANEOUS at 11:44

## 2019-05-20 RX ADMIN — FAMOTIDINE 20 MG: 20 TABLET ORAL at 10:10

## 2019-05-20 NOTE — ANESTHESIA PREPROCEDURE EVALUATION
Relevant Problems No relevant active problems Anesthetic History No history of anesthetic complications Review of Systems / Medical History Patient summary reviewed and pertinent labs reviewed Pulmonary Sleep apnea Asthma Neuro/Psych CVA TIA Comments: Mild cognitive dysfunction 
pseudotumore cerebri 
schizophrenia Cardiovascular Hypertension Exercise tolerance: >4 METS 
  
GI/Hepatic/Renal 
  
 
 
Renal disease: CRI Endo/Other Diabetes: type 2 Morbid obesity Other Findings Physical Exam 
 
Airway Mallampati: III 
TM Distance: 4 - 6 cm Neck ROM: decreased range of motion, short neck Mouth opening: Diminished (comment) Cardiovascular Regular rate and rhythm,  S1 and S2 normal,  no murmur, click, rub, or gallop Rhythm: regular Rate: normal 
 
 
 
 Dental 
 
Dentition: Lower dentition intact and Upper dentition intact Pulmonary Breath sounds clear to auscultation Abdominal 
GI exam deferred Other Findings Anesthetic Plan ASA: 3 Anesthesia type: general 
 
 
 
 
Induction: Intravenous Anesthetic plan and risks discussed with: Patient

## 2019-05-20 NOTE — PERIOP NOTES
Per Dr Michaela Joshi, we are not covering the blood sugar of 202, patient took 10 units of Humalog this morning at  0730, her sugar was 220 at home. We will check the sugar after the procedure.

## 2019-05-20 NOTE — PERIOP NOTES
REport received from [facility] Phase 2. Reviewed with . Pt is somulent  And will rest until she is more alert.

## 2019-05-20 NOTE — OP NOTES
Operative Report    Patient: Keanu Madrigal MRN: [id number]  SSN: [id number]    YOB: 1960  Age: 63 y.o. Sex: female       Date of Surgery: 5/20/2019     Preoperative Diagnosis: k02.9  f41.9,l12.9,f33.9,e11.9,i10,j44.9,j44.9,j45.909,r07.9,g47.30,z68.41     Postoperative Diagnosis: k02.9  f41.9,l12.9,f33.9,e11.9,i10,j44.9,j44.9,j45.909,r07.9,g47.30,z68.41     Surgeon(s) and Role:     * Domingo Masters MD - Primary    Anesthesia: General     Procedure: Procedure(s):  extraction of teeth 19,20,29    Follow up: The patient was instructed to follow up at the office in one week. The patient was given appropriate postoperative prescriptions with directions. Preop/Indications: The patient was seen previously on an outpatient basis. Following physical examination and review of medical history it was recommended to be done in outpatient hospital setting with intubated general anesthesia. PREOP:    H & P reviewed - no changes from consultation appointment, Consent confirmed signed and tooth numbers confirmed with patient. PATIENT STILL SYMPTOMATIC, Escort Present, Patient NPO 8 hrs, Timeout performed: confirmed patient name, treatment plan, and medical issues. Procedure in Detail:   After the patient was properly identified by Anesthesia, appropriately consented, the patient was taken back to the operating room. Via smooth IV induction, the patient was intubated atraumatically. The patient was turned over to Oral Surgery, prepped and draped in a normal sterilel fashion for intraoral surgical procedures. The mouth was thoroughly suctioned and inspected with the Yankauer suction. A throat pack was Placed. Anesthesia was informed that the throat pack was placed. A total of 12 cc 2% xylocaine with 100,000 epinephrine was infiltrated for bilateral mandibular blocks. Attention was focused to the patients left and right  side.   A distobucal incision was made with a 15 blade extending to tooth 19 20 and area 29 . A full thickness subperiosteol flap was eleveated atraumatically. All nerve, arteries, and tissue were retracted atraumatically. It must be noted that full thickness flaps were performed in all locations of the teeth being extracted and/or surgeical procedure being performed (mentioned above). A 15 blade was used for sulcular incision with a distal buccal  release. Overlying bone (superior/buccal) was removed with a currete/#6 round bur areas of tooth #'s 19 20 29  At all times copius irrigation was used during the removal of bone and tooth sectioning  Teeth #' 19 were vertically sectioned with a #6 round bur and a straight elevator. Note: During sectioning of the lower teeth the lingual bone was perserved and the bur never violated the linguall tissue. At all times care was taken to use a buccal surgical approach to expose and section the tooth. An atraumatic extraction of the teeth mentioned above  and/or roots was performed with a universal forceps following needed bone removal.   All sharp edges were smooth with a ronguer forceps. The site was irrigated with Normal Saline Irrigation. Site curretaged and irrigated. Care taken when curretage apical portion of socket  Following the extraction 3.0 vicryl sutures were place for adequate closure. NOTE:  The following procedure required bone removal with a bur and a flap (if required any sectioning will be mentioned below). Below is a description of each one performed:  Buccal Bone Removed With bur tooth # 19  Sectioning performed with a  bur # 19   Buccal Bone Removed With a tooth # 20    Buccal Bone Removed With a  tooth # 29. The mouth was thoroughly suctioned with a Yankauer suction. The throat pack was removed. Anesthesia was informed that the throat pack was removed. The patient was turned over to anesthesia for an uneventful extubation and an uneventful recovery.      LOCAL ANESTHESIA:    10 cc carpules 1% Lidocaine w/ 1:100k epi. PRESCRIPTIONS:    Narcotics given below are for expected acute pain associate with the above surgical procedure. 7 day dosage dose not exceed 120meq/day  Clindamycin 150 mg tabs  Over the counter pain medication  Norco 5/325. INSTRUCTIONS:    The patient was instructed to   return for observation and suture removal .  Prior to d/c, all sites were deemed hemostatic. All sites were packed with guaze. The patient and escort were given verbal and written postoperative instructions. The patient was given extra guaze. The patient was instruction to follow up at next available appointment or sonner with problems. Estimated Blood Loss:  Minimal    Tourniquet Time: * No tourniquets in log *      Implants: * No implants in log *            Specimens: * No specimens in log *        Drains: None                Complications: None    Counts: Sponge and needle counts were correct times two.     Signed By:  Julian Dumas MD     May 20, 2019

## 2019-05-20 NOTE — DISCHARGE SUMMARY
Discharge Summary     Patient: Tori Sidhu MRN: [id number]  SSN: [id number]    YOB: 1960  Age: 63 y.o. Sex: female       Admit Date: 5/20/2019    Discharge Date: 5/20/2019      Admission Diagnoses: k02.9  f41.9,l12.9,f33.9,e11.9,i10,j44.9,j44.9,j45.909,r07.9,g47.30,z68.41    Discharge Diagnoses:   Problem List as of 5/20/2019 Date Reviewed: 9/6/2017          Codes Class Noted - Resolved    History of colon polyps ICD-10-CM: Z86.010  ICD-9-CM: V12.72  9/17/2018 - Present        OAB (overactive bladder) ICD-10-CM: N32.81  ICD-9-CM: 596.51  8/17/2017 - Present        Kidney disease ICD-10-CM: N28.9  ICD-9-CM: 593.9  Unknown - Present        Hypertension ICD-10-CM: I10  ICD-9-CM: 401.9  Unknown - Present        Hypercholesterolemia ICD-10-CM: E78.00  ICD-9-CM: 272.0  Unknown - Present        Heart abnormality ICD-10-CM: Q24.9  ICD-9-CM: 777. 9  Unknown - Present        Diabetes ([facility] ICD-10-CM: E11.9  ICD-9-CM: 250.00  Unknown - Present        Depression ICD-10-CM: F32.9  ICD-9-CM: 242  Unknown - Present        Cerebral artery occlusion with cerebral infarction ([facility] ICD-10-CM: I63.50  ICD-9-CM: 434.91  Unknown - Present        Asthma ICD-10-CM: J45.909  ICD-9-CM: 493.90  Unknown - Present        Arthritis ICD-10-CM: M19.90  ICD-9-CM: 716.90  Unknown - Present        Anemia ICD-10-CM: D64.9  ICD-9-CM: 285. 9  Unknown - Present        Inadequate sleep hygiene ICD-10-CM: Z72.821  ICD-9-CM: 307.49  2/21/2016 - Present        Generalized anxiety disorder ICD-10-CM: F41.1  ICD-9-CM: 300.02  8/25/2015 - Present        Nightmares ICD-10-CM: F51.5  ICD-9-CM: 307.47  8/25/2015 - Present        Anxiety disorder ICD-10-CM: F41.9  ICD-9-CM: 300.00  2/23/2015 - Present        H/O: CVA (cerebrovascular accident) ICD-10-CM: Z86.73  ICD-9-CM: V12.54  2/23/2015 - Present        Insomnia ICD-10-CM: G47.00  ICD-9-CM: 780.52  2/23/2015 - Present        Obesity (BMI 30-39. 9) ICD-10-CM: E66.9  ICD-9-CM: 278.00 2/23/2015 - Present        VIRI (obstructive sleep apnea) ICD-10-CM: G47.33  ICD-9-CM: 327.23  2/23/2015 - Present    Overview Signed 7/12/2017  3:06 PM by Glendy JARQUIN     Overview:   3/17/15--PSG--TST: 396 minutes, AHI: 7, REM: 42, min sat: 89%, PLMSI: 20, PLMSIA: 5  6/9/15--Titration--Residual AHI: 2, min sat: 91%, PLMSI: 50, PLMSIA: 4, Max and Recommended pressure: 20mgQ9Q  CPAP: [id number]  DME: [facility]             Parasomnia ICD-10-CM: G47.50  ICD-9-CM: 307.47  2/23/2015 - Present        Sleep paralysis ICD-10-CM: G47.8  ICD-9-CM: 780.58  2/23/2015 - Present        Papilloma of breast ICD-10-CM: D24.9  ICD-9-CM: 684  8/6/2012 - Present        Chronic pain disorder ICD-10-CM: G89.4  ICD-9-CM: 338.4  6/1/2011 - Present        Fatigue ICD-10-CM: R53.83  ICD-9-CM: 780.79  6/1/2011 - Present        Neurocognitive deficits ICD-10-CM: R29.818, R41.89  ICD-9-CM: 781.99  6/1/2011 - Present        Pseudotumor cerebri ICD-10-CM: G93.2  ICD-9-CM: 348.2  6/1/2011 - Present        Muscle pain ICD-10-CM: M79.10  ICD-9-CM: 729.1  6/25/2010 - Present        Demyelinating disease ([facility] ICD-10-CM: G37.9  ICD-9-CM: 341.9  3/25/2010 - Present        Common migraine ICD-10-CM: G43.009  ICD-9-CM: 346.10  12/11/2009 - Present        Complicated migraine SWQ-64-BN: G43.109  ICD-9-CM: 346.00  12/11/2009 - Present        Headache ICD-10-CM: R51  ICD-9-CM: 784.0  5/18/2009 - Present        Small vessel disease ([address] ICD-10-CM: I73.9  ICD-9-CM: 443.9  5/18/2009 - Present        Essential hypertension, benign ICD-10-CM: I10  ICD-9-CM: 401.1  12/11/2008 - Present        Mixed hyperlipidemia ICD-10-CM: E78.2  ICD-9-CM: 272.2  12/11/2008 - Present        Paresthesia ICD-10-CM: R20.2  ICD-9-CM: 782.0  12/11/2008 - Present    Overview Signed 7/12/2017  3:06 PM by Dano Norton     Overview:   Right hemiparesthesias w/ neg neuro w/u                    Discharge Condition: Good    Hospital Course: sds    Consults: None    Significant Diagnostic Studies: labs: none    Disposition: home    Discharge Medications:   Current Discharge Medication List      CONTINUE these medications which have NOT CHANGED    Details   aspirin delayed-release 81 mg tablet Take 81 mg by mouth daily. butalbital-acetaminophen-caffeine (FIORICET, ESGIC) -40 mg per tablet Take 1 Tab by Mouth Every 4 Hours As Needed for Pain (headache). Max 4 tabs per day. cyanocobalamin (VITAMIN B-12) 1,000 mcg sublingual tablet Take 1,000 mcg by mouth daily. insulin aspart U-100 (NOVOLOG) 100 unit/mL inpn 25 Units. insulin glargine (LANTUS,BASAGLAR) 100 unit/mL (3 mL) inpn 60 Units by SubCUTAneous route nightly. OMALIZUMAB SC Inject  beneath the skin Every 2 weeks      pravastatin (PRAVACHOL) 40 mg tablet TAKE 1 TABLET BY MOUTH ONCE DAILY      senna (SENOKOT) 8.6 mg tablet Take 8.6 mg by mouth as needed. albuterol (PROVENTIL HFA, VENTOLIN HFA, PROAIR HFA) 90 mcg/actuation inhaler 2 Puffs. hydrOXYzine HCl (ATARAX) 25 mg tablet TAKE 1 TO 2 TABLETS BY MOUTH  EVERY EVENING  Refills: 2      fexofenadine (ALLEGRA) 180 mg tablet Take 180 mg by mouth daily. montelukast (SINGULAIR) 10 mg tablet Take 10 mg by mouth daily. Omeprazole delayed release (PRILOSEC D/R) 20 mg tablet Take 20 mg by mouth daily. topiramate (TOPAMAX) 50 mg tablet Take  by mouth two (2) times a day. levocetirizine (XYZAL) 5 mg tablet Take 5 mg by mouth daily as needed. furosemide (LASIX) 40 mg tablet Take  by mouth daily. spironolactone (ALDACTONE) 50 mg tablet Take 50 mg by mouth two (2) times a day. fluticasone (FLONASE) 50 mcg/actuation nasal spray 2 Sprays by Both Nostrils route daily. methocarbamol (ROBAXIN) 750 mg tablet Take 750 mg by mouth two (2) times a day. potassium chloride (K-DUR, KLOR-CON) 20 mEq tablet Take  by mouth two (2) times a day. pindolol (VISKEN) 10 mg tablet Take  by mouth daily.       LORazepam (ATIVAN) 0.5 mg tablet Take 0.5 mg by mouth as needed. benztropine (COGENTIN) 0.5 mg tablet Take 0.5 mg by mouth two (2) times a day. dicyclomine (BENTYL) 20 mg tablet Take 20 mg by mouth two (2) times a day. perphenazine (TRILAFON) 2 mg tablet Take 2 mg by mouth two (2) times a day. ergocalciferol (VITAMIN D2) 50,000 unit capsule Take 50,000 Units by mouth every seven (7) days. nitroglycerin (NITROSTAT) 0.4 mg SL tablet by SubLINGual route every five (5) minutes as needed for Chest Pain. donepezil (ARICEPT) 10 mg tablet Take 10 mg by mouth nightly. amitriptyline (ELAVIL) 75 mg tablet Take  by mouth nightly. EPINEPHrine (EPIPEN) 0.3 mg/0.3 mL injection 0.3 mg by IntraMUSCular route once as needed. Activity: Activity as tolerated  Diet: Clear liquids, advance as tolerated  Wound Care: As directed    No orders of the defined types were placed in this encounter.       Signed By: Debra Killian MD     May 20, 2019

## 2019-05-20 NOTE — ANESTHESIA POSTPROCEDURE EVALUATION
Procedure(s): 
extraction of teeth 19,20,29. general 
 
Anesthesia Post Evaluation Multimodal analgesia: multimodal analgesia used between 6 hours prior to anesthesia start to PACU discharge Patient location during evaluation: bedside Patient participation: complete - patient participated Level of consciousness: awake Pain management: adequate Airway patency: patent Anesthetic complications: no 
Cardiovascular status: stable Respiratory status: acceptable Hydration status: acceptable Post anesthesia nausea and vomiting:  controlled Vitals Value Taken Time /71 5/20/2019  1:07 PM  
Temp 36.4 °C (97.6 °F) 5/20/2019 11:59 AM  
Pulse 74 5/20/2019  1:09 PM  
Resp 17 5/20/2019  1:09 PM  
SpO2 95 % 5/20/2019  1:09 PM  
Vitals shown include unvalidated device data.

## 2019-05-20 NOTE — H&P
H/p reviewed. ..stopped aspirin 6 days ago. Reviewed plan with patient. Plan removal 19 20 29 No changes med history.  
 
drg

## 2019-05-20 NOTE — PERIOP NOTES
Pre-Op Summary Pt arrived via car with family/friend and is oriented to time, place, person and situation. Patient with steady gait with none assistive devices. Visit Vitals /79 (BP 1 Location: Left arm, BP Patient Position: At rest) Pulse 74 Temp 98.7 °F (37.1 °C) Resp 18 Ht 5' 2\" (1.575 m) Wt 105.8 kg (233 lb 3.2 oz) SpO2 98% BMI 42.65 kg/m² Peripheral IV located on Right hand . Patients belongings are located with . Patient's point of contact is Marco Simms and their contact number is: [phone]. They will be in the waiting room. They are able to receive medication information. They will be their ride home.

## 2019-05-20 NOTE — DISCHARGE INSTRUCTIONS
[facility] [address] [facility]  Call office [phone] with any questions or visit [email or web address]    DAY OF SURGERY:  Immediately after surgery. Patients who received a general anesthetic should return home from the office immediately upon discharge, and lie down with the head elevated until all the effects of the anesthetic has disappeared. Anesthetic effects vary by individual, and you may feel drowsy for a short period of time or for several hours. You should not operate any mechanical equipment or drive a motor vehicle for at least 12 hours or longer if you feel any residual effect from the anesthetic. 1. Do not drive or use appliances or equipment that could be dangerous, suck as power tools, stoves, burners,  and garbage disposals. 2. Watch our for dizziness. Walk slowly and take your time. Sudden changes of position can also cause nausea. 3. Do not make any important decisions. You may change your mind tomorrow. 4. Do not drink any alcoholic beverages. The drugs in your body may cause your reaction to alcohol to be dangerous. 5. Diet: If you feel nauseated or sick to your stomach, drink clear liquids like 7-up, broth, apple juice, ginger ale, tea or cola or eat jello. If these liquids do not make you sick to your stomach try eating soft foods like potatoes, rice, pasta and cereal.  6. Discuss any questions you may have with your surgeon, Dr. Galvan [address] or Dr. Kiara Hyde, who can be reached at [phone].  7. In the event of a medical emergency, call 911. ORAL HYGIENE AND CARE: Do not disturb the surgical area today. Bite down gently but firmly on the gauze pack that we have initially placed over the surgical area making sure that they remain in place. Do not change them for the first hour unless the bleeding is not being controlled. This is important to allow blood clot formation on the surgical site. The gauze may be changed when necessary and/or repositioned for comfort.  DO NOT drink with a  Straw and DO NOT rinse or brush your teeth vigorously or probe the area with the tongue, any objects or your fingers. You may brush your teeth gently, carefully avoiding the surgical site. DO NOT SMOKE for at least 72 hours, since it is detrimental to the healing process. NEXT DAY: Start rinsing your mouth with a warm salt water rinse (1/2 tsp salt with 1 cup water). Continue this for several days, then rinse 3-4 times a day for the next 2 weeks. You may start normal toothbrushing the day after the surgery or after bleeding is controlled. It is imperative to keep your mouth clean, since an accumulation of food or debris may promote infection. BLEEDING: Some bleeding is normal, and blood tinged saliva may be present for 24 hours. This may be controlled by placing fresh gauze over the surgical area and biting down firmly for 30-60 seconds. STEADY BLEEDING: Bleeding should not be severe. If bleeding persists, this may be due to the gauze pads being clenched between the teeth rather than exerting pressure on the surgical site. Try repositioning the gauze. If bleeding persists or become heavy, substitute a moist tea bag (first soaked in hot water, squeezed dry and wrapped in a moist gauze) on the area for 20-30 minutes. If bleeding continues, please call our office. SWELLING OR BRUISING: Swelling is to be expected, and usually reached its maximum in 48 hours. To minimize swelling, cold packs or ice bag wrapped in towel should be applied to the face adjacent to the surgical area. This should be applied 20 minutes on then removed for 20 minutes during the first 12-24 hours after surgery. If you were prescribed medicine for the control of swelling, be sure to take it as directed. After 24 hours,it is usually best to switch from using the cold pack to applying moist heat or heading pad to the same area until swelling has receded. Bruising may also occur, but should disappear soon.  Tightness of the jaw muscles may cause difficulty in opening the mouth. This should disappear with 7 days. Keep lips moist with cream or vaseline to prevent cracking or chapping. [address] [facility]  Call office [phone] with any questions or visit [email or web address]    DAY OF SURGERY:  Immediately after surgery. Patients who received a general anesthetic should return home from the office immediately upon discharge, and lie down with the head elevated until all the effects of the anesthetic has disappeared. Anesthetic effects vary by individual, and you may feel drowsy for a short period of time or for several hours. You should not operate any mechanical equipment or drive a motor vehicle for at least 12 hours or longer if you feel any residual effect from the anesthetic. 1. Do not drive or use appliances or equipment that could be dangerous, suck as power tools, stoves, burners,  and garbage disposals. 2. Watch our for dizziness. Walk slowly and take your time. Sudden changes of position can also cause nausea. 3. Do not make any important decisions. You may change your mind tomorrow. 4. Do not drink any alcoholic beverages. The drugs in your body may cause your reaction to alcohol to be dangerous. 5. Diet: If you feel nauseated or sick to your stomach, drink clear liquids like 7-up, broth, apple juice, ginger ale, tea or cola or eat jello. If these liquids do not make you sick to your stomach try eating soft foods like potatoes, rice, pasta and cereal.  6. Discuss any questions you may have with your surgeon, Dr. Galvan [address] or Dr. Adela Bonds, who can be reached at [phone].  7. In the event of a medical emergency, call 911. ORAL HYGIENE AND CARE: Do not disturb the surgical area today. Bite down gently but firmly on the gauze pack that we have initially placed over the surgical area making sure that they remain in place.  Do not change them for the first hour unless the bleeding is not being controlled. This is important to allow blood clot formation on the surgical site. The gauze may be changed when necessary and/or repositioned for comfort. DO NOT drink with a  Straw and DO NOT rinse or brush your teeth vigorously or probe the area with the tongue, any objects or your fingers. You may brush your teeth gently, carefully avoiding the surgical site. DO NOT SMOKE for at least 72 hours, since it is detrimental to the healing process. NEXT DAY: Start rinsing your mouth with a warm salt water rinse (1/2 tsp salt with 1 cup water). Continue this for several days, then rinse 3-4 times a day for the next 2 weeks. You may start normal toothbrushing the day after the surgery or after bleeding is controlled. It is imperative to keep your mouth clean, since an accumulation of food or debris may promote infection. BLEEDING: Some bleeding is normal, and blood tinged saliva may be present for 24 hours. This may be controlled by placing fresh gauze over the surgical area and biting down firmly for 30-60 seconds. STEADY BLEEDING: Bleeding should not be severe. If bleeding persists, this may be due to the gauze pads being clenched between the teeth rather than exerting pressure on the surgical site. Try repositioning the gauze. If bleeding persists or become heavy, substitute a moist tea bag (first soaked in hot water, squeezed dry and wrapped in a moist gauze) on the area for 20-30 minutes. If bleeding continues, please call our office. SWELLING OR BRUISING: Swelling is to be expected, and usually reached its maximum in 48 hours. To minimize swelling, cold packs or ice bag wrapped in towel should be applied to the face adjacent to the surgical area. This should be applied 20 minutes on then removed for 20 minutes during the first 12-24 hours after surgery. If you were prescribed medicine for the control of swelling, be sure to take it as directed.  After 24 hours,it is usually best to switch from using the cold pack to applying moist heat or heading pad to the same area until swelling has receded. Bruising may also occur, but should disappear soon. Tightness of the jaw muscles may cause difficulty in opening the mouth. This should disappear with 7 days. Keep lips moist with cream or vaseline to prevent cracking or chapping. [address] [facility]  Call office [phone] with any questions or visit [email or web address]    DAY OF SURGERY:  Immediately after surgery. Patients who received a general anesthetic should return home from the office immediately upon discharge, and lie down with the head elevated until all the effects of the anesthetic has disappeared. Anesthetic effects vary by individual, and you may feel drowsy for a short period of time or for several hours. You should not operate any mechanical equipment or drive a motor vehicle for at least 12 hours or longer if you feel any residual effect from the anesthetic. 1. Do not drive or use appliances or equipment that could be dangerous, suck as power tools, stoves, burners,  and garbage disposals. 2. Watch our for dizziness. Walk slowly and take your time. Sudden changes of position can also cause nausea. 3. Do not make any important decisions. You may change your mind tomorrow. 4. Do not drink any alcoholic beverages. The drugs in your body may cause your reaction to alcohol to be dangerous. 5. Diet: If you feel nauseated or sick to your stomach, drink clear liquids like 7-up, broth, apple juice, ginger ale, tea or cola or eat jello. If these liquids do not make you sick to your stomach try eating soft foods like potatoes, rice, pasta and cereal.  6. Discuss any questions you may have with your surgeon, Dr. Galvan [address] or Dr. Jone Toure, who can be reached at [phone].  7. In the event of a medical emergency, call [phone]. ORAL HYGIENE AND CARE: Do not disturb the surgical area today.  Bite down gently but firmly on the gauze pack that we have initially placed over the surgical area making sure that they remain in place. Do not change them for the first hour unless the bleeding is not being controlled. This is important to allow blood clot formation on the surgical site. The gauze may be changed when necessary and/or repositioned for comfort. DO NOT drink with a  Straw and DO NOT rinse or brush your teeth vigorously or probe the area with the tongue, any objects or your fingers. You may brush your teeth gently, carefully avoiding the surgical site. DO NOT SMOKE for at least 72 hours, since it is detrimental to the healing process. NEXT DAY: Start rinsing your mouth with a warm salt water rinse (1/2 tsp salt with 1 cup water). Continue this for several days, then rinse 3-4 times a day for the next 2 weeks. You may start normal toothbrushing the day after the surgery or after bleeding is controlled. It is imperative to keep your mouth clean, since an accumulation of food or debris may promote infection. BLEEDING: Some bleeding is normal, and blood tinged saliva may be present for 24 hours. This may be controlled by placing fresh gauze over the surgical area and biting down firmly for 30-60 seconds. STEADY BLEEDING: Bleeding should not be severe. If bleeding persists, this may be due to the gauze pads being clenched between the teeth rather than exerting pressure on the surgical site. Try repositioning the gauze. If bleeding persists or become heavy, substitute a moist tea bag (first soaked in hot water, squeezed dry and wrapped in a moist gauze) on the area for 20-30 minutes. If bleeding continues, please call our office. SWELLING OR BRUISING: Swelling is to be expected, and usually reached its maximum in 48 hours. To minimize swelling, cold packs or ice bag wrapped in towel should be applied to the face adjacent to the surgical area.  This should be applied 20 minutes on then removed for 20 minutes during the first 12-24 hours after surgery. If you were prescribed medicine for the control of swelling, be sure to take it as directed. After 24 hours,it is usually best to switch from using the cold pack to applying moist heat or heading pad to the same area until swelling has receded. Bruising may also occur, but should disappear soon. Tightness of the jaw muscles may cause difficulty in opening the mouth. This should disappear with 7 days. Keep lips moist with cream or vaseline to prevent cracking or chapping. DISCHARGE SUMMARY from Nurse    PATIENT INSTRUCTIONS:    After general anesthesia or intravenous sedation, for 24 hours or while taking prescription Narcotics:  · Limit your activities  · Do not drive and operate hazardous machinery  · Do not make important personal or business decisions  · Do  not drink alcoholic beverages  · If you have not urinated within 8 hours after discharge, please contact your surgeon on call. Report the following to your surgeon:  · Excessive pain, swelling, redness or odor of or around the surgical area  · Temperature over 100.5  · Nausea and vomiting lasting longer than 4 hours or if unable to take medications  · Any signs of decreased circulation or nerve impairment to extremity: change in color, persistent  numbness, tingling, coldness or increase pain  · Any questions    What to do at Home:  Recommended activity: Activity as tolerated and no driving for today,       These are general instructions for a healthy lifestyle:    No smoking/ No tobacco products/ Avoid exposure to second hand smoke  Surgeon General's Warning:  Quitting smoking now greatly reduces serious risk to your health.     Obesity, smoking, and sedentary lifestyle greatly increases your risk for illness    A healthy diet, regular physical exercise & weight monitoring are important for maintaining a healthy lifestyle    You may be retaining fluid if you have a history of heart failure or if you experience any of the following symptoms:  Weight gain of 3 pounds or more overnight or 5 pounds in a week, increased swelling in our hands or feet or shortness of breath while lying flat in bed. Please call your doctor as soon as you notice any of these symptoms; do not wait until your next office visit. Recognize signs and symptoms of STROKE:    F-face looks uneven    A-arms unable to move or move unevenly    S-speech slurred or non-existent    T-time-call 911 as soon as signs and symptoms begin-DO NOT go       Back to bed or wait to see if you get better-TIME IS BRAIN. Warning Signs of HEART ATTACK     Call 911 if you have these symptoms:   Chest discomfort. Most heart attacks involve discomfort in the center of the chest that lasts more than a few minutes, or that goes away and comes back. It can feel like uncomfortable pressure, squeezing, fullness, or pain.  Discomfort in other areas of the upper body. Symptoms can include pain or discomfort in one or both arms, the back, neck, jaw, or stomach.  Shortness of breath with or without chest discomfort.  Other signs may include breaking out in a cold sweat, nausea, or lightheadedness. Don't wait more than five minutes to call 911 - MINUTES MATTER! Fast action can save your life. Calling 911 is almost always the fastest way to get lifesaving treatment. Emergency Medical Services staff can begin treatment when they arrive -- up to an hour sooner than if someone gets to the hospital by car. The discharge information has been reviewed with the patient. The patient verbalized understanding. Discharge medications reviewed with the patient and appropriate educational materials and side effects teaching were provided. ___________________________________________________________________________________________________________________________________    Patient armband removed and given to patient to take home.   Patient was informed of the privacy risks if armband lost or rosario

## 2019-05-20 NOTE — PERIOP NOTES
1158  Received pt. Connected pt to monitor. VSS. Assessment preformed. RN at bedside. Will continue to monitor.   
 
1210 report given to [address] [place] [address]

## 2019-05-20 NOTE — BRIEF OP NOTE
BRIEF OPERATIVE NOTE Date of Procedure: 5/20/2019 Preoperative Diagnosis: k02.9  f41.9,l12.9,f33.9,e11.9,i10,j44.9,j44.9,j45.909,r07.9,g47.30,z68.41 Postoperative Diagnosis: k02.9  f41.9,l12.9,f33.9,e11.9,i10,j44.9,j44.9,j45.909,r07.9,g47.30,z68.41 Procedure(s): 
extraction of teeth 19,20,29 
hosp visit Surgeon(s) and Role: Connye Lesches, MD - Primary Surgical Assistant: Alfredo rodriguez Surgical Staff: 
Circ-1: Loni Cedeño Circ-Relief: Beto Drake RN Scrub Tech-1: Yu Rey; [place] Event Time In Time Out Incision Start [phone] Incision Close 1132 Anesthesia: General  
Estimated Blood Loss: min Specimens: * No specimens in log * Findings: caries swelling Complications: none Implants: * No implants in log *

## 2021-08-03 PROBLEM — I10 HYPERTENSION: Status: RESOLVED | Noted: 2021-08-03 | Resolved: 2021-08-03

## (undated) DEVICE — STERILE POLYISOPRENE POWDER-FREE SURGICAL GLOVES: Brand: PROTEXIS

## (undated) DEVICE — SYR 50ML SLIP TIP NSAF LF STRL --

## (undated) DEVICE — SYR 50ML LR LCK 1ML GRAD NSAF --

## (undated) DEVICE — YANKAUER,FLEXIBLE HANDLE,REGLR CAPACITY: Brand: MEDLINE INDUSTRIES, INC.

## (undated) DEVICE — MEDI-VAC SUCTION HANDLE REGULAR CAPACITY: Brand: CARDINAL HEALTH

## (undated) DEVICE — FLEX ADVANTAGE 1500CC: Brand: FLEX ADVANTAGE

## (undated) DEVICE — MAGNETIC INSTRUMENT PAD 10" X 16"; MEDIUM; DISPOSABLE: Brand: CARDINAL HEALTH

## (undated) DEVICE — INTENDED FOR TISSUE SEPARATION, AND OTHER PROCEDURES THAT REQUIRE A SHARP SURGICAL BLADE TO PUNCTURE OR CUT.: Brand: BARD-PARKER ® CARBON RIB-BACK BLADES

## (undated) DEVICE — SOLUTION IV 1000ML 0.9% SOD CHL

## (undated) DEVICE — PACKING GZ W2INXL6FT WVN COT VAG RADPQ

## (undated) DEVICE — NEEDLE HYPO 25GA L1.5IN BLU POLYPR HUB S STL REG BVL STR

## (undated) DEVICE — Z DISCONTINUED USE 2425483 (LOW STOCK PER MEDLINE) TAPE UMB L18IN DIA1/8IN WHT COT NONABSORBABLE W/O NDL FOR

## (undated) DEVICE — SYR 10ML CTRL LR LCK NSAF LF --

## (undated) DEVICE — SPONGE GZ W4XL4IN COT 12 PLY TYP VII WVN C FLD DSGN

## (undated) DEVICE — 10FR FRAZIER SUCTION HANDLE: Brand: CARDINAL HEALTH

## (undated) DEVICE — STERILE LATEX POWDER-FREE SURGICAL GLOVESWITH NITRILE COATING: Brand: PROTEXIS

## (undated) DEVICE — NEEDLE HYPO 25GA L1.5IN BVL ORIENTED ECLIPSE

## (undated) DEVICE — CATHETER IV 18GA L1.25IN GRN FEP SFTY STR HUB RADPQ DISP

## (undated) DEVICE — KENDALL 500 SERIES DIAPHORETIC FOAM MONITORING ELECTRODE - TEAR DROP SHAPE ( 30/PK): Brand: KENDALL

## (undated) DEVICE — BASIN EMESIS 500CC ROSE 250/CS 60/PLT: Brand: MEDEGEN MEDICAL PRODUCTS, LLC

## (undated) DEVICE — TAPE UMB 1/8X30IN MP COT WHT --

## (undated) DEVICE — DEPAUL MINOR HEAD AND NECK: Brand: MEDLINE INDUSTRIES, INC.

## (undated) DEVICE — MEDI-VAC NON-CONDUCTIVE SUCTION TUBING: Brand: CARDINAL HEALTH

## (undated) DEVICE — KIT COLON W/ 1.1OZ LUB AND 2 END

## (undated) DEVICE — KENDALL SCD EXPRESS SLEEVES, KNEE LENGTH, MEDIUM: Brand: KENDALL SCD

## (undated) DEVICE — SUTURE VCRL SZ 4-0 L27IN ABSRB UD L19MM PS-2 3/8 CIR PRIM J426H

## (undated) DEVICE — SURGIFOAM SPNG SZ 12-7

## (undated) DEVICE — DEVICE INFL 60ML 12ATM CONVENIENT LOK REL HNDL HI PRSS FLX

## (undated) DEVICE — Device